# Patient Record
Sex: MALE | Race: BLACK OR AFRICAN AMERICAN | NOT HISPANIC OR LATINO | Employment: OTHER | URBAN - METROPOLITAN AREA
[De-identification: names, ages, dates, MRNs, and addresses within clinical notes are randomized per-mention and may not be internally consistent; named-entity substitution may affect disease eponyms.]

---

## 2021-07-29 ENCOUNTER — TELEPHONE (OUTPATIENT)
Dept: NEUROLOGY | Facility: OTHER | Age: 41
End: 2021-07-29

## 2021-07-29 NOTE — TELEPHONE ENCOUNTER
Caller: Dax Alfonso    Relationship: Self    Best call back number: [phone]    What was the call regarding: MR. ALFONSO CALLED STATING THAT HE SHOWED ON HIS [email or web address] PORTAL A REFERRAL [place] SEVERAL MONTHS AGO [place], /[facility]. INFORMED PT THAT WE HAVE NO DOCUMENTATION OF THIS REFERRAL WITHIN HIS CHART AT THIS TIME. RECOMMENDED HE CONTACT [facility] TO HAVE THEM REFAX HIS REFERRAL IF HE IS INTERESTED IN BEING SEEN. PT ASKED ME IF HIS REFERRAL WAS STILL NEEDED AS THIS WAS SEVERAL MONTHS AGO AND HE HAS RECEIVED SEVERAL DX'S SINCE THEN. INFORMED PT THAT THIS IS NOT INFORMATION I HAVE OR A DECISION THAT I CAN MAKE FOR HIM. INFORMED PT THAT I RECOMMEND HE CONTACT [facility] TO CLARIFY WHY HE WAS REFERRED TO [facility]UR[facility] AND IF THEY BELIEVED REFERRAL WAS STILL NECESSARY. INFORMED PT THAT IF SO, REFERRAL AND RECORDS WILL NEED TO BE FAXED AND OFFERED THE HUB FAX NUMBER. PT DECLINED BUT DID STATE THAT HE WOULD CALL [facility] TO DISCUSS THE MATTER.    DOCUMENTING PER [facility] PROTOCOL.